# Patient Record
Sex: MALE | Race: WHITE | NOT HISPANIC OR LATINO
[De-identification: names, ages, dates, MRNs, and addresses within clinical notes are randomized per-mention and may not be internally consistent; named-entity substitution may affect disease eponyms.]

---

## 2017-12-28 PROBLEM — Z00.00 ENCOUNTER FOR PREVENTIVE HEALTH EXAMINATION: Status: ACTIVE | Noted: 2017-12-28

## 2017-12-29 ENCOUNTER — APPOINTMENT (OUTPATIENT)
Dept: NEUROLOGY | Facility: CLINIC | Age: 72
End: 2017-12-29
Payer: MEDICARE

## 2017-12-29 VITALS
DIASTOLIC BLOOD PRESSURE: 90 MMHG | WEIGHT: 183 LBS | HEIGHT: 64 IN | SYSTOLIC BLOOD PRESSURE: 132 MMHG | BODY MASS INDEX: 31.24 KG/M2

## 2017-12-29 DIAGNOSIS — Z78.9 OTHER SPECIFIED HEALTH STATUS: ICD-10-CM

## 2017-12-29 DIAGNOSIS — F41.9 ANXIETY DISORDER, UNSPECIFIED: ICD-10-CM

## 2017-12-29 DIAGNOSIS — Z86.79 PERSONAL HISTORY OF OTHER DISEASES OF THE CIRCULATORY SYSTEM: ICD-10-CM

## 2017-12-29 PROCEDURE — 99214 OFFICE O/P EST MOD 30 MIN: CPT

## 2018-03-26 ENCOUNTER — APPOINTMENT (OUTPATIENT)
Dept: NEUROLOGY | Facility: CLINIC | Age: 73
End: 2018-03-26
Payer: MEDICARE

## 2018-03-26 VITALS
HEIGHT: 64 IN | SYSTOLIC BLOOD PRESSURE: 122 MMHG | WEIGHT: 183 LBS | DIASTOLIC BLOOD PRESSURE: 60 MMHG | BODY MASS INDEX: 31.24 KG/M2

## 2018-03-26 PROCEDURE — 99213 OFFICE O/P EST LOW 20 MIN: CPT

## 2018-06-25 ENCOUNTER — APPOINTMENT (OUTPATIENT)
Dept: NEUROLOGY | Facility: CLINIC | Age: 73
End: 2018-06-25
Payer: MEDICARE

## 2018-06-25 VITALS
BODY MASS INDEX: 31.24 KG/M2 | DIASTOLIC BLOOD PRESSURE: 80 MMHG | HEIGHT: 64 IN | WEIGHT: 183 LBS | SYSTOLIC BLOOD PRESSURE: 130 MMHG

## 2018-06-25 PROCEDURE — 99213 OFFICE O/P EST LOW 20 MIN: CPT

## 2018-09-04 ENCOUNTER — APPOINTMENT (OUTPATIENT)
Dept: NEUROLOGY | Facility: CLINIC | Age: 73
End: 2018-09-04

## 2018-11-06 ENCOUNTER — MEDICATION RENEWAL (OUTPATIENT)
Age: 73
End: 2018-11-06

## 2018-12-28 ENCOUNTER — APPOINTMENT (OUTPATIENT)
Dept: NEUROLOGY | Facility: CLINIC | Age: 73
End: 2018-12-28
Payer: MEDICARE

## 2018-12-28 VITALS
WEIGHT: 163 LBS | BODY MASS INDEX: 27.83 KG/M2 | HEIGHT: 64 IN | SYSTOLIC BLOOD PRESSURE: 130 MMHG | DIASTOLIC BLOOD PRESSURE: 70 MMHG

## 2018-12-28 PROCEDURE — 99213 OFFICE O/P EST LOW 20 MIN: CPT

## 2019-01-03 ENCOUNTER — RX RENEWAL (OUTPATIENT)
Age: 74
End: 2019-01-03

## 2019-01-03 RX ORDER — CARBIDOPA AND LEVODOPA 50; 200 MG/1; MG/1
50-200 TABLET, EXTENDED RELEASE ORAL
Qty: 90 | Refills: 3 | Status: ACTIVE | COMMUNITY
Start: 2017-12-29 | End: 1900-01-01

## 2019-03-18 ENCOUNTER — MEDICATION RENEWAL (OUTPATIENT)
Age: 74
End: 2019-03-18

## 2019-03-22 ENCOUNTER — APPOINTMENT (OUTPATIENT)
Dept: NEUROLOGY | Facility: CLINIC | Age: 74
End: 2019-03-22
Payer: MEDICARE

## 2019-03-22 VITALS
WEIGHT: 152 LBS | SYSTOLIC BLOOD PRESSURE: 150 MMHG | HEIGHT: 64 IN | BODY MASS INDEX: 25.95 KG/M2 | DIASTOLIC BLOOD PRESSURE: 70 MMHG

## 2019-03-22 DIAGNOSIS — G47.33 OBSTRUCTIVE SLEEP APNEA (ADULT) (PEDIATRIC): ICD-10-CM

## 2019-03-22 DIAGNOSIS — G20 PARKINSON'S DISEASE: ICD-10-CM

## 2019-03-22 PROCEDURE — 99214 OFFICE O/P EST MOD 30 MIN: CPT

## 2019-03-22 NOTE — ASSESSMENT
[FreeTextEntry1] : This is a 70-year-old man with Parkinson disease as well as now report excessive daytime sleepiness.\par \par Parkinson's disease: She'll continue Sinemet 10/100 mg tablets 4 tablets 3 times a day. He also has a prescription for extended release Sinemet overnight. I will refer him for home care evaluation. He does not appear to be safe at home alone during the day would benefit from supervision with an 8. It also benefit from at home physical therapy as is very difficult for him to leave home for appointments and seeing how he is having frequent falls and very difficult time walking.\par \par Obstructive sleep apnea: He has reported snoring he wakes up in overnight and 1724 his MRC scale. We'll do home sleep study. Should this be positive are firm to sleep medicine specialist for options of sleep apnea treatment.\par \par I will see him back in 3 months, sooner should the need arise. I've asked her son to call me in the interim should have any problems, questions or concerns.

## 2019-03-22 NOTE — HISTORY OF PRESENT ILLNESS
[FreeTextEntry1] : Followup December 29, 2017:\par This is a 72-year-old gentleman who returns today with idiopathic Parkinson's disease. He is currently taking Sinemet two 10/100 mg tablets 3 times a day. He continues with physical therapy for gait issues. He continues to have problems with his walking despite the physical therapy. He and his son report that in the morning he is more stiff and it takes a few hours after his initial Sinemet dose to recover. He is here today for neurologic followup.\par \par Followup March 26, 2018:\par This is a 72-year-old man who returns today for followup of Parkinson's disease. He had tried a extended release Sinemet overnight but was not helping in the morning. He still woke up stiff and unsteady on his feet. His primary issue of Parkinson's his gait and falling. He goes to physical therapy once per week but does not necessarily do much exercise and home. He states that he is stable on his feet with a walker or cane but if he leaves the walker or a rales he may fall while walking from his couch to the cane or walker. He increased his Sinemet to 4 tablets of 10/100 mg 3 times a day from 2 tablets 3 times a day. He seemed to doing a little bit better with this regimen. He here today for neurologic followup.\par \par Followup June 25, 2018:\par This is a 72-year-old man returns today for followup of Parkinson's disease. He's overall doing stable with his main issue being walking and falling. He gets stiff at times and falls over a period is kyphosis of the neck which makes his posture poor which is increasing his risk of falling. He is maintained on Sinemet 10/100 mg tablets, 3 tablets 4 times a day. He feels that this is keeping him stable. He did ask today about a deep brain stimulator. He is here today for routine followup.\par \par Followup December 20 8018:\par This is a 73-year-old man who presents today for followup Parkinson's disease. He continues to have issues with walking and falling. Having difficulty pivoting. He is still in physical therapy. He uses a walker sometimes became today with a cane. He is currently taking Sinemet 10/100 mg tablets 3 tablets 4 times a day. He is parkinsonian symptoms are stable with this. He is here today for neurologic followup.\par \par Follow up March 22, 2019:\par [facility] 33[facility]'s disease. He is on Sinemet 10/100 mg tablets she is not taking 4 tablets 3 times a day. He is reporting more unsteadiness and falls. His son states that he fell at home and is on the floor for 2 hours unable to get up. He is more unsteady on his feet. He is also reporting excessive daytime sleepiness. Pain score is 17/24 and at worst scale. He is here today for neurologic followup.

## 2019-03-22 NOTE — CONSULT LETTER
[Dear  ___] : Dear  [unfilled], [Courtesy Letter:] : I had the pleasure of seeing your patient, [unfilled], in my office today. [Please see my note below.] : Please see my note below. [Consult Closing:] : Thank you very much for allowing me to participate in the care of this patient.  If you have any questions, please do not hesitate to contact me. [Sincerely,] : Sincerely, [FreeTextEntry3] : Mazin Herrera M.D., Ph.D. DPN-N\par [facility]\par [facility] [place]\par Medical Director of Stroke Services\par [facility]\par

## 2019-03-22 NOTE — REVIEW OF SYSTEMS
[As Noted in HPI] : as noted in HPI [Leg Weakness] : leg weakness [Difficulty Walking] : difficulty walking [Frequent Falls] : frequent falls [Negative] : Heme/Lymph

## 2019-03-22 NOTE — PHYSICAL EXAM
[Person] : oriented to person [Place] : oriented to place [Time] : oriented to time [Remote Intact] : remote memory intact [Registration Intact] : recent registration memory intact [Span Intact] : the attention span was normal [Concentration Intact] : normal concentrating ability [Visual Intact] : visual attention was ~T not ~L decreased [Naming Objects] : no difficulty naming common objects [Repeating Phrases] : no difficulty repeating a phrase [Fluency] : fluency intact [Comprehension] : comprehension intact [Current Events] : adequate knowledge of current events [Past History] : adequate knowledge of personal past history [Cranial Nerves Optic (II)] : visual acuity intact bilaterally,  visual fields full to confrontation, pupils equal round and reactive to light [Cranial Nerves Oculomotor (III)] : extraocular motion intact [Cranial Nerves Trigeminal (V)] : facial sensation intact symmetrically [Cranial Nerves Facial (VII)] : face symmetrical [Cranial Nerves Vestibulocochlear (VIII)] : hearing was intact bilaterally [Cranial Nerves Glossopharyngeal (IX)] : tongue and palate midline [Cranial Nerves Accessory (XI - Cranial And Spinal)] : head turning and shoulder shrug symmetric [Cranial Nerves Hypoglossal (XII)] : there was no tongue deviation with protrusion [Motor Strength] : muscle strength was normal in all four extremities [Involuntary Movements] : no involuntary movements were seen [No Muscle Atrophy] : normal bulk in all four extremities [Paresis Pronator Drift Right-Sided] : no pronator drift on the right [Paresis Pronator Drift Left-Sided] : no pronator drift on the left [Sensation Tactile Decrease] : light touch was intact [Sensation Pain / Temperature Decrease] : pain and temperature was intact [Sensation Vibration Decrease] : vibration was intact [Proprioception] : proprioception was intact [Tremor] : no tremor present [Coordination - Dysmetria Impaired Finger-to-Nose Bilateral] : not present [2+] : Patella left 2+ [FreeTextEntry6] : slight increase in tone on right UE>left UE [FreeTextEntry8] : poor posture, hunched over, unsteady at times using son for suuport [Sclera] : the sclera and conjunctiva were normal [PERRL With Normal Accommodation] : pupils were equal in size, round, reactive to light, with normal accommodation [Extraocular Movements] : extraocular movements were intact [No APD] : no afferent pupillary defect [No REE] : no internuclear ophthalmoplegia [Full Visual Field] : full visual field

## 2019-04-01 ENCOUNTER — APPOINTMENT (OUTPATIENT)
Dept: NEUROLOGY | Facility: CLINIC | Age: 74
End: 2019-04-01

## 2019-04-08 ENCOUNTER — RX RENEWAL (OUTPATIENT)
Age: 74
End: 2019-04-08

## 2019-04-08 ENCOUNTER — APPOINTMENT (OUTPATIENT)
Dept: NEUROLOGY | Facility: CLINIC | Age: 74
End: 2019-04-08

## 2019-04-08 RX ORDER — CARBIDOPA AND LEVODOPA 10; 100 MG/1; MG/1
10-100 TABLET ORAL 4 TIMES DAILY
Qty: 1080 | Refills: 1 | Status: ACTIVE | COMMUNITY
Start: 2017-12-29 | End: 1900-01-01

## 2019-06-28 ENCOUNTER — APPOINTMENT (OUTPATIENT)
Dept: NEUROLOGY | Facility: CLINIC | Age: 74
End: 2019-06-28

## 2022-02-28 ENCOUNTER — APPOINTMENT (OUTPATIENT)
Dept: SURGERY | Facility: CLINIC | Age: 77
End: 2022-02-28
Payer: MEDICARE

## 2022-02-28 VITALS
OXYGEN SATURATION: 96 % | HEART RATE: 83 BPM | HEIGHT: 66 IN | TEMPERATURE: 98.2 F | WEIGHT: 148 LBS | SYSTOLIC BLOOD PRESSURE: 162 MMHG | DIASTOLIC BLOOD PRESSURE: 84 MMHG | BODY MASS INDEX: 23.78 KG/M2

## 2022-02-28 PROCEDURE — 99203 OFFICE O/P NEW LOW 30 MIN: CPT

## 2022-03-07 ENCOUNTER — NON-APPOINTMENT (OUTPATIENT)
Age: 77
End: 2022-03-07

## 2022-03-18 ENCOUNTER — NON-APPOINTMENT (OUTPATIENT)
Age: 77
End: 2022-03-18

## 2022-04-28 ENCOUNTER — APPOINTMENT (OUTPATIENT)
Dept: SURGERY | Facility: CLINIC | Age: 77
End: 2022-04-28
Payer: MEDICARE

## 2022-04-28 VITALS
DIASTOLIC BLOOD PRESSURE: 82 MMHG | SYSTOLIC BLOOD PRESSURE: 138 MMHG | HEART RATE: 76 BPM | TEMPERATURE: 98 F | OXYGEN SATURATION: 98 % | WEIGHT: 143 LBS | BODY MASS INDEX: 22.98 KG/M2 | HEIGHT: 66 IN

## 2022-04-28 DIAGNOSIS — N50.89 OTHER SPECIFIED DISORDERS OF THE MALE GENITAL ORGANS: ICD-10-CM

## 2022-04-28 DIAGNOSIS — R19.09 OTHER INTRA-ABDOMINAL AND PELVIC SWELLING, MASS AND LUMP: ICD-10-CM

## 2022-04-28 PROCEDURE — 99072 ADDL SUPL MATRL&STAF TM PHE: CPT

## 2022-04-28 PROCEDURE — 99213 OFFICE O/P EST LOW 20 MIN: CPT
